# Patient Record
Sex: MALE
[De-identification: names, ages, dates, MRNs, and addresses within clinical notes are randomized per-mention and may not be internally consistent; named-entity substitution may affect disease eponyms.]

---

## 2019-11-13 ENCOUNTER — APPOINTMENT (OUTPATIENT)
Dept: ORTHOPEDIC SURGERY | Facility: CLINIC | Age: 34
End: 2019-11-13
Payer: COMMERCIAL

## 2019-11-13 VITALS
HEART RATE: 48 BPM | HEIGHT: 67 IN | WEIGHT: 147 LBS | BODY MASS INDEX: 23.07 KG/M2 | SYSTOLIC BLOOD PRESSURE: 140 MMHG | DIASTOLIC BLOOD PRESSURE: 83 MMHG

## 2019-11-13 DIAGNOSIS — S66.118A: ICD-10-CM

## 2019-11-13 PROBLEM — Z00.00 ENCOUNTER FOR PREVENTIVE HEALTH EXAMINATION: Status: ACTIVE | Noted: 2019-11-13

## 2019-11-13 PROCEDURE — 73110 X-RAY EXAM OF WRIST: CPT | Mod: LT

## 2019-11-13 PROCEDURE — 99204 OFFICE O/P NEW MOD 45 MIN: CPT

## 2019-11-13 NOTE — HISTORY OF PRESENT ILLNESS
[de-identified] : Mr. OSIRIS SUERO is a RHD 34 year male who presents to office complaining of left wrist pain.\liasha Pina medical assistant needed and utilized as .\par Patient says at home last night, patient was moving a heavy cabinet when he felt a sharp pulling/pain in the ventral/anterior aspect of his wrist.\par Patient then called his boss as patient works as a  to say that he would not be able to work today. As a result, patient's boss provided patient with Aleve to take for the pain, which helped a little.\par Patient notes pain in the wrist, particularly with flexion and extension of the wrist as well as with pronation and supination.\par He says his wrist feels weaker, especially when he tries to  or lift something.\par Patient denies numbness or tingling.\par All review of systems, family history, social history, surgical history, past medical history, medications, and allergies not previously stated as positive are negative. They were reviewed by me today with the patient and documented accordingly.

## 2019-11-13 NOTE — PHYSICAL EXAM
[de-identified] : Left Wrist: No obvious deformities, atrophy, ecchymosis, or swelling/effusion. Negative tenderness to carpals, distal radius/ulna, anatomical snuffbox, and distal extensor tendons. Positive tenderness to distal flexor tendons. Normal active and passive range of motion, including flexion and extension to 60 degrees, and radial/ulnar deviation to 50 degrees. Negative Steele Maneuver, Fovea sign, Ulnar Grind test, Piano test, Finkelstein's test, Tinel sign, and Phalen sign. Neurovascular status is intact.\par \par Left Hand/Fingers: No obvious deformities, atrophy, ecchymosis, or swelling/effusion. No signs of paronychia, felon, or Kanavel's four cardinal signs. Negative tenderness over the metacarpals, phalanges, MCP joints, PIP joints, DIP joints, and thenar/hypothenar eminences. Normal active and passive range of motion, including flexion and extension of the MCP, PIP, and DIP joints. Neurovascular status is intact.\par \par Otherwise, no apparent distress. Alert and oriented x 3. Normal mood and affect. No atrophy or swelling. 5 out of 5 motor strength. Sensation is intact and symmetrical. Normal deep tendon reflexes. Full range of motion of shoulder, elbows, hips, and knees (flexion, extension, and rotation). No palpatory tenderness or palpable lymph nodes. Negative straight leg raise. Normal finger-to-nose test. No pathological reflexes. Normal ambulation. No upper or lower extremity instability. [de-identified] : 3 views of left wrist are negative for any obvious fractures or dislocations. The patient understands that this is a wet read and I am not a radiologist. If the final read reveals something different than discussed in the office, then the patient will get a call back in the next 24 - 48 hours to discuss.

## 2019-11-13 NOTE — DISCUSSION/SUMMARY
[de-identified] : Left wrist flexor strain\par Continue OTC Aleve for the pain.\par Ice wrist 20 mins on and 20 mins off 3 times or more per day.\par MRI left wrist ordered.\par Cockup wrist brace supplied.\par Follow up with Dr. Evans following MRI.\par No work until follow up with Dr. Evans.\par All options discussed with patient.\par All questions by patient answered to their satisfaction.\par Patient expresses full understanding and agreement with plan.\par Patient is happy with the office visit.

## 2019-11-14 ENCOUNTER — OTHER (OUTPATIENT)
Age: 34
End: 2019-11-14

## 2019-11-14 PROBLEM — S66.118A: Status: ACTIVE | Noted: 2019-11-13

## 2019-12-03 ENCOUNTER — FORM ENCOUNTER (OUTPATIENT)
Age: 34
End: 2019-12-03

## 2019-12-04 ENCOUNTER — APPOINTMENT (OUTPATIENT)
Dept: MRI IMAGING | Facility: CLINIC | Age: 34
End: 2019-12-04
Payer: MEDICAID

## 2019-12-04 ENCOUNTER — OUTPATIENT (OUTPATIENT)
Dept: OUTPATIENT SERVICES | Facility: HOSPITAL | Age: 34
LOS: 1 days | End: 2019-12-04
Payer: MEDICAID

## 2019-12-04 DIAGNOSIS — Z00.00 ENCOUNTER FOR GENERAL ADULT MEDICAL EXAMINATION WITHOUT ABNORMAL FINDINGS: ICD-10-CM

## 2019-12-04 PROCEDURE — 73221 MRI JOINT UPR EXTREM W/O DYE: CPT | Mod: 26,LT

## 2019-12-04 PROCEDURE — 73221 MRI JOINT UPR EXTREM W/O DYE: CPT

## 2019-12-09 ENCOUNTER — APPOINTMENT (OUTPATIENT)
Dept: ORTHOPEDIC SURGERY | Facility: CLINIC | Age: 34
End: 2019-12-09